# Patient Record
Sex: MALE | Race: WHITE | NOT HISPANIC OR LATINO | Employment: FULL TIME | URBAN - METROPOLITAN AREA
[De-identification: names, ages, dates, MRNs, and addresses within clinical notes are randomized per-mention and may not be internally consistent; named-entity substitution may affect disease eponyms.]

---
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
## 2018-12-17 ENCOUNTER — COMMUNICATION - HEALTHEAST (OUTPATIENT)
Dept: TELEHEALTH | Facility: CLINIC | Age: 18
End: 2018-12-17

## 2018-12-17 ENCOUNTER — OFFICE VISIT - HEALTHEAST (OUTPATIENT)
Dept: PEDIATRICS | Facility: CLINIC | Age: 18
End: 2018-12-17

## 2018-12-17 DIAGNOSIS — Z00.00 ENCOUNTER FOR ANNUAL HEALTH EXAMINATION: ICD-10-CM

## 2018-12-17 ASSESSMENT — MIFFLIN-ST. JEOR: SCORE: 1767.16

## 2019-09-11 ENCOUNTER — COMMUNICATION - HEALTHEAST (OUTPATIENT)
Dept: PEDIATRICS | Facility: CLINIC | Age: 19
End: 2019-09-11

## 2019-09-12 ENCOUNTER — COMMUNICATION - HEALTHEAST (OUTPATIENT)
Dept: PEDIATRICS | Facility: CLINIC | Age: 19
End: 2019-09-12

## 2020-08-28 ENCOUNTER — OFFICE VISIT - HEALTHEAST (OUTPATIENT)
Dept: FAMILY MEDICINE | Facility: CLINIC | Age: 20
End: 2020-08-28

## 2020-08-28 DIAGNOSIS — Z23 NEED FOR TD VACCINE: ICD-10-CM

## 2020-08-28 DIAGNOSIS — Z13.1 SCREENING FOR DIABETES MELLITUS: ICD-10-CM

## 2020-08-28 DIAGNOSIS — Z00.00 ANNUAL PHYSICAL EXAM: ICD-10-CM

## 2020-08-28 DIAGNOSIS — Z13.220 LIPID SCREENING: ICD-10-CM

## 2020-08-28 LAB
CHOLEST SERPL-MCNC: 136 MG/DL
FASTING STATUS PATIENT QL REPORTED: YES
FASTING STATUS PATIENT QL REPORTED: YES
GLUCOSE BLD-MCNC: 86 MG/DL (ref 79–116)
HDLC SERPL-MCNC: 55 MG/DL
LDLC SERPL CALC-MCNC: 70 MG/DL
TRIGL SERPL-MCNC: 54 MG/DL

## 2020-08-28 ASSESSMENT — MIFFLIN-ST. JEOR: SCORE: 1732.57

## 2021-05-25 ENCOUNTER — RECORDS - HEALTHEAST (OUTPATIENT)
Dept: ADMINISTRATIVE | Facility: CLINIC | Age: 21
End: 2021-05-25

## 2021-05-30 VITALS — WEIGHT: 151.4 LBS | HEIGHT: 72 IN | BODY MASS INDEX: 20.51 KG/M2

## 2021-06-02 VITALS — HEIGHT: 72 IN | BODY MASS INDEX: 22.01 KG/M2 | WEIGHT: 162.5 LBS

## 2021-06-04 VITALS
SYSTOLIC BLOOD PRESSURE: 126 MMHG | DIASTOLIC BLOOD PRESSURE: 84 MMHG | BODY MASS INDEX: 20.86 KG/M2 | OXYGEN SATURATION: 99 % | HEIGHT: 72 IN | HEART RATE: 89 BPM | WEIGHT: 154 LBS

## 2021-06-09 NOTE — PROGRESS NOTES
[facility] Well Child Check    ASSESSMENT & PLAN  Jez Paul is a 16 y.o. who has normal growth and normal development.    Diagnoses and all orders for this visit:    Encounter for routine child health examination without abnormal findings  -     Lipid Profile  -     Thyroid Stimulating Hormone (TSH)    Multiple nevi  -     Ambulatory referral to Dermatology      Return to clinic in 1 year for a Well Child Check or sooner as needed    IMMUNIZATIONS/LABS  Discussed Bexsero immunization and information given.  Will return this summer if wants to get vaccine.  Also discussed cholesterol screening- done today.    REFERRALS  Dental:  Recommend routine dental care as appropriate.  Other:  Referrals were made for dermatology    ANTICIPATORY GUIDANCE  I have reviewed age appropriate anticipatory guidance.    HEALTH HISTORY  Do you have any concerns that you'd like to discuss today?: No concerns , he would like a dermatology referral.       Roomed by: Victoria DEVINE CMA    Accompanied by Mother    Refills needed? No    Do you have any forms that need to be filled out? Yes        Do you have any significant health concerns in your family history?: No  Family History   Problem Relation Age of Onset     Hyperlipidemia Father      Since your last visit, have there been any major changes in your family, such as a move, job change, separation, divorce, or death in the family?: Yes: Parents     Home  Who lives in your home?:  Mom, 2 brothers  Social History     Social History Narrative    Lives with mom, 2 brothers      Do you have any trouble with sleep?:  No    Education  What school does your child attend?:  [employer]. He will be going to [employer] next year. Studying math and statistics.  What grade is your child in?:  12th  How does the patient perform in school (grades, behavior, attention, homework?: Pretty good.     Eating  Does patient eat regular meals including fruits and vegetables?:  Yes, but could be  "better per patient.   What is the patient drinking (cow's milk, water, soda, juice, sports drinks, energy drinks, etc)?: cow's milk- skim and water  Does patient have concerns about body or appearance?:  No    Activities  Does the patient have friends?:  yes  Does the patient get at least one hour of physical activity per day?:  yes  Does the patient have less than 2 hours of screen time per day (aside from homework)?:  yes  What does your child do for exercise?:  Gym class, outside  Does the patient have interest/participate in community activities/volunteers/school sports?:  Yes - golf. 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     MENTAL HEALTH SCREENING  PHQ-2 Total Score: 0 (3/17/2017 12:00 PM)  PHQ-2 Total Score: 0 (3/17/2017 12:00 PM)    VISION/HEARING    TB Risk Assessment:  The patient and/or parent/guardian answer positive to:  patient and/or parent/guardian answer 'no' to all screening TB questions    Is child seen by dentist?     Yes    There is no problem list on file for this patient.      Drugs  Does the patient use tobacco/alcohol/drugs?:  no    Safety  Does the patient have any safety concerns (peer or home)?:  no  Does the patient use safety belts, helmets and other safety equipment?:  yes    Sex  Is the patient sexually active?:  No.  In long time relationship with girlfriend, she is currently a osmany in high school.  They have discussed waiting to have sexual intercourse for later in their relationship.    MEASUREMENTS  Height:  5' 11.5\" (1.816 m)  Weight: 151 lb 6.4 oz (68.7 kg)  BMI: Body mass index is 20.82 kg/(m^2).  Blood Pressure: 106/68  Blood pressure percentiles are 6 % systolic and 35 % diastolic based on NHBPEP's 4th Report. Blood pressure percentile targets: 90: 137/87, 95: 141/92, 99 + 5 mmH/105.    PHYSICAL EXAM  General: Awake, Alert and Cooperative   Head: Normocephalic and Atraumatic   Eyes: PERRL and EOMI   ENT: Normal pearly TMs bilaterally and Oropharynx clear   Neck: " Supple and Thyroid without enlargement or nodules   Chest: Chest wall normal   Lungs: Clear to auscultation bilaterally   Heart:: Regular rate and rhythm and no murmurs   Abdomen: Soft, nontender, nondistended and no hepatosplenomegaly   : Normal external male genitalia and sexual maturity rating5   Spine: Spine straight without curvature noted   Musculoskeletal: Moving all extremities and No pain in the extremities   Neuro: Alert and oriented times 3, Normal tone in upper and lower extremities, Grossly normal and DTRs +2 bilaterally   Skin: Multiple nevi present.   Complete sports physical and questionnaire completed, no restrictions.    ADDITIONAL HISTORY SUMMARIZED (2): None.  DECISION TO OBTAIN EXTRA INFORMATION (1): None.   RADIOLOGY TESTS (1): None.  LABS (1): Ordered labs.   MEDICINE TESTS (1): None.  INDEPENDENT REVIEW (2 each): None.     The visit lasted a total of 40 minutes face to face with the patient. Over 50% of the time was spent counseling and educating the patient about teen health and development.    I, Peyton Ng, am scribing for and in the presence of, Dr. Hampton.    I, Dr. Hampton, personally performed the services described in this documentation, as scribed by Peyton Ng in my presence, and it is both accurate and complete.    Total data points: 1

## 2021-06-10 NOTE — PROGRESS NOTES
Assessment:      Healthy male exam.      Plan:      1. Annual physical exam     2. Lipid screening  Lipid [place] FASTING   3. Screening for diabetes mellitus  Glucose   4. Need for Td vaccine  Td, Preservative Free (green label)   Screening labs ordered.  Update tetanus.  School paperwork completed.    Subjective:      Jez Paul is a 19 y.o. male who presents for an annual exam. The patient reports that there is not domestic violence in his life.     Overall patient is doing well. He notes that for sports he is to have a physical done.  He thought that some of his blood pressures that he checked outside of the clinic were high and so he is concerned about that today.  No chest pain, palpitations, lightheadedness, dizziness, numbness, tingling, vision changes.  His blood pressure looks good today    Healthy Habits:   Regular Exercise: Yes  Sunscreen Use: Somewhat  Healthy Diet: Yes  Dental Visits Regularly: Yes  Seat Belt: Yes  Sexually active: Yes  Monthly Self Testicular Exams:  No  Hemoccults: No  Flex Sig: No  Colonoscopy: No  Lipid Profile: No  Glucose Screen: No  Prevention of Osteoporosis: No  Last Dexa: No  Guns at Home:  No      Immunization History   Administered Date(s) Administered     DTaP, historic 03/09/1999, 05/03/1999, 07/12/1999, 05/01/2000, 01/07/2004     HPV Quadrivalent 07/27/2012, 11/01/2012, 03/12/2013     Hep A, historic 01/28/2008, 01/25/2010     Hep B, historic 03/09/1999, 05/03/1999, 05/01/2000     HiB, historic,unspecified 03/09/1999, 05/03/1999, 05/01/2000     IPV 03/09/1999, 05/03/1999, 01/17/2000, 01/06/2003     Influenza X2r4-10, 01/25/2010     Influenza, inj, historic,unspecified 11/01/2012     Influenza, seasonal,quad inj 6-35 mos 09/22/2014     Influenza,seasonal,quad inj =/> 6months 10/12/2015, 12/17/2018     MMR 01/17/2000, 01/07/2004     Meningococcal B (PF) 12/17/2018     Meningococcal MCV4P 01/25/2010, 10/12/2015     Pneumo Conj 7-V(before 2010) 11/15/2000, 01/12/2001      Rotavirus, pentavalent 05/03/1999, 07/12/1999     Tdap 01/25/2010     Varicella 01/17/2000, 01/28/2008     Immunization status: up to date and documented.    No exam data present    No current outpatient medications on file.     No current facility-administered medications for this visit.      No past medical history on file.  No past surgical history on file.  Patient has no known allergies.  Family History   Problem Relation Age of Onset     Hyperlipidemia Father      Social History     Socioeconomic History     Marital status: Single     Spouse name: Not on file     Number of children: Not on file     Years of education: Not on file     Highest education level: Not on file   Occupational History     Not on file   Social Needs     Financial resource strain: Not on file     Food insecurity     Worry: Not on file     Inability: Not on file     Transportation needs     Medical: Not on file     Non-medical: Not on file   Tobacco Use     Smoking status: Never Smoker     Smokeless tobacco: Never Used   Substance and Sexual Activity     Alcohol use: Not on file     Drug use: Not on file     Sexual activity: Not on file   Lifestyle     Physical activity     Days per week: Not on file     Minutes per session: Not on file     Stress: Not on file   Relationships     Social connections     Talks on phone: Not on file     Gets together: Not on file     Attends Sikh service: Not on file     Active member of club or organization: Not on file     Attends meetings of clubs or organizations: Not on file     Relationship status: Not on file     Intimate partner violence     Fear of current or ex partner: Not on file     Emotionally abused: Not on file     Physically abused: Not on file     Forced sexual activity: Not on file   Other Topics Concern     Not on file   Social History Narrative    Lives with mom, 2 brothers        Review of Systems  General:  Denies problem  Eyes: Denies problem  Ears/Nose/Throat: Denies  "problem  Cardiovascular: Denies problem  Respiratory:  Denies problem  Gastrointestinal:  Denies problem  Genitourinary: Denies problem  Musculoskeletal:  Denies problem  Skin: Denies problem  Neurologic: Denies problem  Psychiatric: Denies problem  Endocrine: Denies problem  Heme/Lymphatic: Denies problem   Allergic/Immunologic: Denies problem        Objective:     Vitals:    08/28/20 1047   BP: 126/84   Pulse: 89   SpO2: 99%   Weight: 154 lb (69.9 kg)   Height: 5' 11.75\" (1.822 m)     Body mass index is 21.03 kg/m .    Physical  General Appearance: Alert, cooperative, no distress, appears stated age  Head: Normocephalic, without obvious abnormality, atraumatic  Eyes: PERRL, conjunctiva/corneas clear, EOM's intact  Ears: Normal TM's and external ear canals, both ears  Nose: Nares normal, septum midline,mucosa normal, no drainage  Throat: Lips, mucosa, and tongue normal; teeth and gums normal  Neck: Supple, symmetrical, trachea midline, no adenopathy;  thyroid: not enlarged, symmetric, no tenderness/mass/nodules; no carotid bruit or JVD  Back: Symmetric, no curvature, ROM normal, no CVA tenderness  Lungs: Clear to auscultation bilaterally, respirations unlabored  Heart: Regular rate and rhythm, S1 and S2 normal, no murmur, rub, or gallop,  Abdomen: Soft, non-tender, bowel sounds active all four quadrants,  no masses, no organomegaly  Genitourinary: Deferred   Musculoskeletal: Normal range of motion. No joint swelling or deformity.   Extremities: Extremities normal, atraumatic, no cyanosis or edema  Skin: Skin color, texture, turgor normal, no rashes or lesions  Lymph nodes: Cervical, supraclavicular, and axillary nodes normal  Neurologic: He is alert. He has normal reflexes.   Psychiatric: He has a normal mood and affect.        Josh Becker CNP    "

## 2021-06-22 NOTE — PROGRESS NOTES
[facility] Well Child Check    ASSESSMENT & PLAN  Jez Paul is a 17 y.o. who has normal growth and normal development.    Diagnoses and all orders for this visit:    Encounter for annual health examination  -     Influenza, Seasonal,Quad Inj, 36+ MOS (multi-dose vial)  -     PHQ9 Depression Screen    Other orders  -     Meningococcal B (PF)     Multiple nevi present- has yearly visits with dermatology Has not had any removals needed.    Return to clinic in 1 year for a Well Child Check or sooner as needed   Discussed continue care with me for 1 year, then transtion to adult medicine.  Reviewed treatment for shin splints should they occur again, as well as low back muscle spasm/ pain: treat with ibuprofen 800 mg three times a day for 5-7 days for improvement in pain and ROM.    IMMUNIZATIONS/LABS  Immunizations were reviewed and orders were placed as appropriate.    REFERRALS  Dental:  The patient has already established care with a dentist.  Other:  No additional referrals were made at this time.    ANTICIPATORY GUIDANCE  I have reviewed age appropriate anticipatory guidance.    HEALTH HISTORY  Do you have any concerns that you'd like to discuss today?: No concerns     Attending [employer].  Sophomore year  Likely will graduate in 3.5 years.  Wants to be an .  Math major with computer science minor. Has a good roommate this year.  He does not drink alcohol.  This has limited some social opportunities -but Jez doesn't care about that.   He is doing well in school.  Long term girlfriend is a freshman at [employer] and he sees her when he can  They have recently started having sexual intercourse.  Girlfriend takes OCPs regularly and using condoms as well.    No question data found.    Do you have any significant health concerns in your family history?: No  Family History   Problem Relation Age of Onset     Hyperlipidemia Father      Since your last visit, have there been any major changes in your  family, such as a move, job change, separation, divorce, or death in the family?: No  Has a lack of transportation kept you from medical appointments?: No    Home  Who lives in your home?:  Mom,2 brothers  Social History     Social History Narrative    Lives with mom, 2 brothers      Do you have any concerns about losing your housing?: No  Is your housing safe and comfortable?: Yes  Do you have any trouble with sleep?:  No    Education  What school do you child attend?:  [employer]  What grade are you in?:  second year in college  How do you perform in school (grades, behavior, attention, homework?: Good     Eating  Do you eat regular meals including fruits and vegetables?:  no  What are you drinking (cow's milk, water, soda, juice, sports drinks, energy drinks, etc)?: cow's milk- skim, water and juice  Have you been worried that you don't have enough food?: No  Do you have concerns about your body or appearance?:  No    Activities  Do you have friends?:  yes  Do you get at least one hour of physical activity per day?:  yes  How many hours a day are you in front of a screen other than for schoolwork (computer, TV, phone)?:  2  What do you do for exercise?:  Frisbee football and basketball   Do you have interest/participate in community activities/volunteers/school sports?:  yes    MENTAL HEALTH SCREENING  PHQ-2 Total Score: 0 (12/17/2018 11:37 AM)    PHQ-9 Total Score: 0 (12/17/2018 11:37 AM)      TB Risk Assessment:  The patient and/or parent/guardian answer positive to:  patient and/or parent/guardian answer 'no' to all screening TB questions    Dyslipidemia Risk Screening  Have either of your parents or any of your grandparents had a stroke or heart attack before age 55?: No  Any parents with high cholesterol or currently taking medications to treat?: Yes     Dental  When was the last time you saw the dentist?: Less than 30 days ago.  Approx date (required): 12/17/18   Last fluoride varnish application was  "within the past 30 days. Fluoride not applied today.      There is no problem list on file for this patient.      Drugs  Does the patient use tobacco/alcohol/drugs?:  no    Safety  Does the patient have any safety concerns (peer or home)?:  no  Does the patient use safety belts, helmets and other safety equipment?:  yes    Sex  Have you ever had sex?:  Yes; has been in long term monogamous relationship with girlfriend Cara.  She is on oral contraceptive for birth control and they also use condoms. Cara is Jez's only sexual partner at this time or in his past.    MEASUREMENTS  Height:  5' 11.5\" (1.816 m)  Weight: 162 lb 8 oz (73.7 kg)  BMI: Body mass index is 22.35 kg/m .  Blood Pressure: 94/60  Blood pressure percentiles are <1 % systolic and 10 % diastolic based on the 2017 AAP Clinical Practice Guideline. Blood pressure percentile targets: 90: 137/83, 95: 141/87, 95 + 12 mmH/99.    Constitutional: He appears well-developed and well-nourished. He is awake, alert, and cooperative.  HEENT: Head: Normocephalic. Atraumatic.   Right Ear: Normal, pearly tympanic membrane; external ear and canal normal.    Left Ear: Normal, pearly tympanic membrane, external ear and canal normal.    Nose: Nose normal.    Mouth/Throat: Mucous membranes are moist. Oropharynx is clear. Tonsils +1 bilaterally. Normal dentition.   Eyes: Conjunctivae and lids are normal. PERRL, EOMI.  Neck: Supple without lymphadenopathy or tenderness. No thyromegaly or nodules.   Cardiovascular: Normal rate and regular rhythm. No murmur heard. Femoral pulses 2+ bilaterally.  Pulmonary: Clear to auscultation bilaterally. Effort and breath sounds normal. There is normal air entry.  Chest: Normal chest wall.    Abdominal: Soft, nontender, nondistended. No hepatosplenomegaly. Bowel sounds are normal.   Genitourinary: Normal external male genitalia. Testes descended bilaterally. He is circumcised. SMR 5.  No abnormal scrotal lumps with palpation of " testicles. No inguinal hernia noted.  Musculoskeletal: Moving all extremities with normal range of motion. Normal strength and tone. No abnormalities. No pain in the extremities.  Neurological: He is alert and oriented x3. He has normal reflexes. Normal tone and DTRs +2 bilaterally.  Psychiatric: He has a normal mood and affect. His speech and behavior are normal.  Skin: multiple varying nevi present.  Mild acne on chest on back (comedomal)

## 2021-06-27 ENCOUNTER — HEALTH MAINTENANCE LETTER (OUTPATIENT)
Age: 21
End: 2021-06-27

## 2021-10-17 ENCOUNTER — HEALTH MAINTENANCE LETTER (OUTPATIENT)
Age: 21
End: 2021-10-17

## 2021-11-19 ENCOUNTER — OFFICE VISIT (OUTPATIENT)
Dept: FAMILY MEDICINE | Facility: CLINIC | Age: 21
End: 2021-11-19
Payer: COMMERCIAL

## 2021-11-19 VITALS
HEART RATE: 94 BPM | OXYGEN SATURATION: 99 % | BODY MASS INDEX: 22.48 KG/M2 | WEIGHT: 166 LBS | HEIGHT: 72 IN | DIASTOLIC BLOOD PRESSURE: 76 MMHG | SYSTOLIC BLOOD PRESSURE: 130 MMHG

## 2021-11-19 DIAGNOSIS — Z13.1 SCREENING FOR DIABETES MELLITUS: ICD-10-CM

## 2021-11-19 DIAGNOSIS — Z13.220 SCREENING, LIPID: ICD-10-CM

## 2021-11-19 DIAGNOSIS — Z00.00 ROUTINE MEDICAL EXAM: Primary | ICD-10-CM

## 2021-11-19 LAB
ANION GAP SERPL CALCULATED.3IONS-SCNC: 10 MMOL/L (ref 5–18)
BUN SERPL-MCNC: 13 MG/DL (ref 8–22)
CALCIUM SERPL-MCNC: 10 MG/DL (ref 8.5–10.5)
CHLORIDE BLD-SCNC: 104 MMOL/L (ref 98–107)
CHOLEST SERPL-MCNC: 150 MG/DL
CO2 SERPL-SCNC: 28 MMOL/L (ref 22–31)
CREAT SERPL-MCNC: 1.07 MG/DL (ref 0.7–1.3)
FASTING STATUS PATIENT QL REPORTED: NO
GFR SERPL CREATININE-BSD FRML MDRD: >90 ML/MIN/1.73M2
GLUCOSE BLD-MCNC: 87 MG/DL (ref 70–125)
HDLC SERPL-MCNC: 55 MG/DL
HOLD SPECIMEN: NORMAL
LDLC SERPL CALC-MCNC: 86 MG/DL
POTASSIUM BLD-SCNC: 4.1 MMOL/L (ref 3.5–5)
SODIUM SERPL-SCNC: 142 MMOL/L (ref 136–145)
TRIGL SERPL-MCNC: 45 MG/DL

## 2021-11-19 PROCEDURE — 36415 COLL VENOUS BLD VENIPUNCTURE: CPT | Performed by: FAMILY MEDICINE

## 2021-11-19 PROCEDURE — 99395 PREV VISIT EST AGE 18-39: CPT | Performed by: FAMILY MEDICINE

## 2021-11-19 PROCEDURE — 80048 BASIC METABOLIC PNL TOTAL CA: CPT | Performed by: FAMILY MEDICINE

## 2021-11-19 PROCEDURE — 80061 LIPID PANEL: CPT | Performed by: FAMILY MEDICINE

## 2021-11-19 ASSESSMENT — MIFFLIN-ST. JEOR: SCORE: 1790.97

## 2021-11-19 NOTE — PROGRESS NOTES
"  ASSESSMENT/PLAN:       (Z00.00) Routine medical exam  (primary encounter diagnosis)  Comment: Generally the patient is doing very well.  We discussed healthy lifestyles, including adequate exercise (3-4 times a week for 20-30 minutes), and a healthy diet.  Patient should return for annual physicals, and we can also see them here as needed.      (Z13.1) Screening for diabetes mellitus  Comment:   Plan: Basic metabolic panel, Extra Tube            (Z13.220) Screening, lipid  Comment:   Plan: Lipid panel reflex to direct LDL Fasting, Extra        Tube              Patient has been advised of split billing requirements and indicates understanding: Yes  COUNSELING:   Reviewed preventive health counseling, as reflected in patient instructions       Regular exercise       Healthy diet/nutrition       Alcohol Use        Safe sex practices/STD prevention    Estimated body mass index is 21.03 kg/m  as calculated from the following:    Height as of 8/28/20: 1.822 m (5' 11.75\").    Weight as of 8/28/20: 69.9 kg (154 lb).         He reports that he has never smoked. He has never used smokeless tobacco.          SUBJECTIVE:   CC: Jez Paul is an 20 year old male who presents for preventative health visit.     Split Bill scripting  The purpose of this visit is to discuss your medical history and prevent health problems before you are sick. You may be responsible for a co-pay, coinsurance, or deductible if your visit today includes services such as checking on a sore throat, having an x-ray or lab test, or treating and evaluating a new or existing condition :[id number]}  Patient has been advised of split billing requirements and indicates understanding: Yes  Healthy Habits:     Getting at least 3 servings of Calcium per day:  Yes    Bi-annual eye exam:  NO    Dental care twice a year:  Yes    Sleep apnea or symptoms of sleep apnea:  None    Diet:  Regular (no restrictions)    Frequency of exercise:  2-3 days/week    Duration " of exercise:  30-45 minutes    Taking medications regularly:  Not Applicable    Medication side effects:  Not applicable    PHQ-2 Total Score: 0    Additional concerns today:  No              Today's PHQ-2 Score:   PHQ-2 ( 1999 Pfizer) 11/18/2021   Q1: Little interest or pleasure in doing things 0   Q2: Feeling down, depressed or hopeless 0   PHQ-2 Score 0   Q1: Little interest or pleasure in doing things Not at all   Q2: Feeling down, depressed or hopeless Not at all   PHQ-2 Score 0       Abuse: Current or Past(Physical, Sexual or Emotional)- No  Do you feel safe in your environment? Yes    Have you ever done Advance Care Planning? (For example, a Health Directive, POLST, or a discussion with a medical provider or your loved ones about your wishes):     Social History     Tobacco Use     Smoking status: Never Smoker     Smokeless tobacco: Never Used   Substance Use Topics     Alcohol use: Yes     Comment: 1-2 / week         Alcohol Use 11/18/2021   Prescreen: >3 drinks/day or >7 drinks/week? No       Last PSA: No results found for: PSA    Reviewed orders with patient. Reviewed health maintenance and updated orders accordingly - Yes  Labs reviewed in EPIC  No Known Allergies    Reviewed and updated as needed this visit by clinical staff  Tobacco  Allergies  Meds   Med Hx  Surg Hx  Fam Hx  Soc Hx       Reviewed and updated as needed this visit by Provider  Tobacco  Allergies  Meds   Med Hx  Surg Hx  Fam Hx  Soc Hx      History reviewed. No pertinent past medical history.   History reviewed. No pertinent surgical history.    Review of Systems  CONSTITUTIONAL: NEGATIVE for fever, chills, change in weight  INTEGUMENTARY/SKIN: NEGATIVE for worrisome rashes, moles or lesions  EYES: NEGATIVE for vision changes or irritation  ENT: NEGATIVE for ear, mouth and throat problems  RESP: NEGATIVE for significant cough or SOB  CV: NEGATIVE for chest pain, palpitations or peripheral edema  GI: NEGATIVE for nausea,  abdominal pain, heartburn, or change in bowel habits   male: negative for dysuria, hematuria, decreased urinary stream, erectile dysfunction, urethral discharge  MUSCULOSKELETAL: NEGATIVE for significant arthralgias or myalgia  NEURO: NEGATIVE for weakness, dizziness or paresthesias  PSYCHIATRIC: NEGATIVE for changes in mood or affect    OBJECTIVE:   /76 (BP Location: Left arm, Patient Position: Sitting, Cuff Size: Adult Large)   Pulse 94   Ht 1.829 m (6')   Wt 75.3 kg (166 lb)   SpO2 99%   BMI 22.51 kg/m      Physical Exam  GENERAL: healthy, alert and no distress  EYES: Eyes grossly normal to inspection, PERRL and conjunctivae and sclerae normal  HENT: ear canals and TM's normal, nose and mouth without ulcers or lesions  NECK: no adenopathy, no asymmetry, masses, or scars and thyroid normal to palpation  RESP: lungs clear to auscultation - no rales, rhonchi or wheezes  CV: regular rate and rhythm, normal S1 S2, no S3 or S4, no murmur, click or rub, no peripheral edema and peripheral pulses strong  ABDOMEN: soft, nontender, no hepatosplenomegaly, no masses and bowel sounds normal  MS: no gross musculoskeletal defects noted, no edema  SKIN: no suspicious lesions or rashes  NEURO: Normal strength and tone, mentation intact and speech normal  PSYCH: mentation appears normal, affect normal/bright    Diagnostic Test Results:  Labs reviewed in Epic  Results for orders placed or performed in visit on 11/19/21   Basic metabolic panel     Status: Normal   Result Value Ref Range    Sodium 142 136 - 145 mmol/L    Potassium 4.1 3.5 - 5.0 mmol/L    Chloride 104 98 - 107 mmol/L    Carbon Dioxide (CO2) 28 22 - 31 mmol/L    Anion Gap 10 5 - 18 mmol/L    Urea Nitrogen 13 8 - 22 mg/dL    Creatinine 1.07 0.70 - 1.30 mg/dL    Calcium 10.0 8.5 - 10.5 mg/dL    Glucose 87 70 - 125 mg/dL    GFR Estimate >90 >60 mL/min/1.73m2   Lipid panel reflex to direct LDL Fasting     Status: None   Result Value Ref Range    Cholesterol 150  <=199 mg/dL    Triglycerides 45 <=149 mg/dL    Direct Measure HDL 55 >=40 mg/dL    LDL Cholesterol Calculated 86 <=129 mg/dL    Patient Fasting > 8hrs? No    Extra Purple Top Tube     Status: None   Result Value Ref Range    Hold Specimen JIC    Extra Tube     Status: None    Narrative    The following orders were created for panel order Extra Tube.  Procedure                               Abnormality         Status                     ---------                               -----------         ------                     Extra Purple Top Tube[[id number]]                            Final result                 Please view results for these tests on the individual orders.       Blake Alonso MD  [facility]

## 2021-12-31 ENCOUNTER — IMMUNIZATION (OUTPATIENT)
Dept: NURSING | Facility: CLINIC | Age: 21
End: 2021-12-31
Payer: COMMERCIAL

## 2021-12-31 PROCEDURE — 0004A PR COVID VAC PFIZER DIL RECON 30 MCG/0.3 ML IM: CPT

## 2021-12-31 PROCEDURE — 91300 PR COVID VAC PFIZER DIL RECON 30 MCG/0.3 ML IM: CPT

## 2022-01-07 ENCOUNTER — TRANSFERRED RECORDS (OUTPATIENT)
Dept: HEALTH INFORMATION MANAGEMENT | Facility: CLINIC | Age: 22
End: 2022-01-07
Payer: COMMERCIAL

## 2022-03-18 ENCOUNTER — OFFICE VISIT (OUTPATIENT)
Dept: FAMILY MEDICINE | Facility: CLINIC | Age: 22
End: 2022-03-18
Payer: COMMERCIAL

## 2022-03-18 VITALS
HEART RATE: 95 BPM | OXYGEN SATURATION: 100 % | DIASTOLIC BLOOD PRESSURE: 72 MMHG | BODY MASS INDEX: 23.46 KG/M2 | WEIGHT: 173 LBS | SYSTOLIC BLOOD PRESSURE: 158 MMHG

## 2022-03-18 DIAGNOSIS — R03.0 ELEVATED BLOOD PRESSURE READING WITHOUT DIAGNOSIS OF HYPERTENSION: ICD-10-CM

## 2022-03-18 PROCEDURE — 99213 OFFICE O/P EST LOW 20 MIN: CPT | Performed by: FAMILY MEDICINE

## 2022-03-18 NOTE — PROGRESS NOTES
Answers for HPI/ROS submitted by the patient on 3/18/2022  Do you check your blood pressure regularly outside of the clinic?: Yes  Are your blood pressures ever more than 140 on the top number (systolic) OR more than 90 on the bottom number (diastolic)? (For example, greater than 140/90): Yes  Are you following a low salt diet?: No  How many servings of fruits and vegetables do you eat daily?: 2-3  On average, how many sweetened beverages do you drink each day (Examples: soda, juice, sweet tea, etc.  Do NOT count diet or artificially sweetened beverages)?: 0  How many minutes a day do you exercise enough to make your heart beat faster?: 30 to 60  How many days a week do you exercise enough to make your heart beat faster?: 5  How many days per week do you miss taking your medication?: 0

## 2022-03-18 NOTE — PROGRESS NOTES
Assessment & Plan     Elevated blood pressure reading without diagnosis of hypertension    I would have him get a meter to use at home to check himself a few times a week and write down some numbers to see what he is doing outside of the white coat and doctor's office.  He will let me know if he is trending numbers are greater than 140 and greater than 90, and if he has any other questions he will let us know.      Review of external notes as documented elsewhere in note  20 minutes spent on the date of the encounter doing chart review, interpretation of tests and patient visit            No follow-ups on file.    Blake Alonso MD  [facility] [place]    Shilpi Story is a 21 year old who presents for the following health issues     HPI     Patient is here today with some concerns about his blood pressure.  He is very young but does have a family history of elevated blood pressures at a young age.  He is had some isolated blood pressure readings that are elevated here and there, but is also has some within normal limits.  He was thinking that it was time to come in and have it checked and determine a plan of action.  No headache no vision changes no blurred vision or numbness or tingling or chest pain or shortness of breath.      Review of Systems   Constitutional, HEENT, cardiovascular, pulmonary, gi and gu systems are negative, except as otherwise noted.      Objective    BP (!) 158/72 (BP Location: Left arm, Patient Position: Sitting, Cuff Size: Adult Regular)   Pulse 95   Wt 78.5 kg (173 lb)   SpO2 100%   BMI 23.46 kg/m    Body mass index is 23.46 kg/m .  Physical Exam   GENERAL: healthy, alert and no distress  NECK: no adenopathy, no asymmetry, masses, or scars and thyroid normal to palpation  RESP: lungs clear to auscultation - no rales, rhonchi or wheezes  CV: regular rate and rhythm, normal S1 S2, no S3 or S4, no murmur, click or rub, no peripheral edema and peripheral pulses  strong  ABDOMEN: soft, nontender, no hepatosplenomegaly, no masses and bowel sounds normal  MS: no gross musculoskeletal defects noted, no edema

## 2022-10-02 ENCOUNTER — HEALTH MAINTENANCE LETTER (OUTPATIENT)
Age: 22
End: 2022-10-02

## 2023-02-11 ENCOUNTER — HEALTH MAINTENANCE LETTER (OUTPATIENT)
Age: 23
End: 2023-02-11

## 2024-03-09 ENCOUNTER — HEALTH MAINTENANCE LETTER (OUTPATIENT)
Age: 24
End: 2024-03-09

## 2025-03-16 ENCOUNTER — HEALTH MAINTENANCE LETTER (OUTPATIENT)
Age: 25
End: 2025-03-16